# Patient Record
Sex: MALE | Race: WHITE | NOT HISPANIC OR LATINO | URBAN - METROPOLITAN AREA
[De-identification: names, ages, dates, MRNs, and addresses within clinical notes are randomized per-mention and may not be internally consistent; named-entity substitution may affect disease eponyms.]

---

## 2018-05-18 ENCOUNTER — EMERGENCY (EMERGENCY)
Facility: HOSPITAL | Age: 28
LOS: 1 days | Discharge: LEFT BEFORE DISCHARGE | End: 2018-05-18
Attending: EMERGENCY MEDICINE
Payer: MEDICAID

## 2018-05-18 VITALS
RESPIRATION RATE: 20 BRPM | OXYGEN SATURATION: 97 % | HEART RATE: 107 BPM | SYSTOLIC BLOOD PRESSURE: 143 MMHG | HEIGHT: 69 IN | TEMPERATURE: 99 F | DIASTOLIC BLOOD PRESSURE: 86 MMHG | WEIGHT: 169.98 LBS

## 2018-05-18 PROCEDURE — 93005 ELECTROCARDIOGRAM TRACING: CPT

## 2018-05-18 PROCEDURE — 99284 EMERGENCY DEPT VISIT MOD MDM: CPT

## 2018-05-18 PROCEDURE — 93010 ELECTROCARDIOGRAM REPORT: CPT

## 2018-05-18 PROCEDURE — 99285 EMERGENCY DEPT VISIT HI MDM: CPT

## 2018-05-18 NOTE — ED PROVIDER NOTE - PROGRESS NOTE DETAILS
Patient is refusing blood work and wants to leave. He will leave against medical. Nurse Carrillo discussed with the patient risks of dying without complete work up from further intoxication. Patient did not wait for me to explain the risks to him. He signed out against medical advice and verbalizes understanding that he may return to the ED at any time. He is neurologically intact and displays sound decision-making capability and is not clinically intoxicated at this time. His family is at the bedside and assumes responsibility for the patient at this time.

## 2018-05-18 NOTE — ED PROVIDER NOTE - MEDICAL DECISION MAKING DETAILS
Patient presents s/p overdose of heroin requiring narcan. Upon arrival, he is awake, alert and not clinically intoxicated. EKG unremarkable. He refuses blood work and "I just want to get out of here." Nursing discussed risks of leaving against medical advice, patient is of sound mind to refuse treatment and family is with patient who assumes responsibility.

## 2018-05-18 NOTE — ED PROVIDER NOTE - NS ED ROS FT
Const: Denies fever, chills  HEENT: Denies blurry vision, sore throat  Neck: Denies neck pain/stiffness  Resp: Denies coughing, SOB  Cardiovascular: Denies CP, palpitations, LE edema  GI: Denies nausea, vomiting, abdominal pain, diarrhea, constipation, blood in stool  : Denies urinary frequency/urgency/dysuria, hematuria  MSK: Denies back pain  Neuro: Denies HA, dizziness, numbness, weakness  Skin: Denies rashes.

## 2018-05-18 NOTE — ED PROVIDER NOTE - OBJECTIVE STATEMENT
28 y/o M pt with a hx of presents to the ED with c/o heroin OD today. Pt states he was with his girlfriend and friend at [facility] when ambulance came to bring him to ED. He was given Narcan by EMS. Pt injects heroin daily (1.5 bags). Pt also admits to smoking marijuana today. occasional EtOH use. Denies HA, CP, NVD. No further complaints at this time. 26 y/o M pt with a hx of presents to the ED with c/o heroin OD today. Pt states he was with his girlfriend and friend at [facility] when ambulance came to bring him to ED when he was found unresponsive. He cannot recall any further events, but was found in the 's seat of his friend's car. He does not drive and therefore believes that he was trying to lock his girlfriend and his friend out of the car to avoid going to the hospital. He was given Narcan by EMS and woke up. Pt injects heroin daily (1.5 bags today). Pt also admits to smoking marijuana today. He admits to occasional EtOH use, but denies any today. Denies HA,CP, N/V/D. No further complaints at this time.

## 2018-05-18 NOTE — ED PROVIDER NOTE - PHYSICAL EXAMINATION
Const: Awake, alert and oriented. In no acute distress. Well appearing.  HEENT: NC/AT. Moist mucous membranes.  Eyes: No scleral icterus. EOMI.  Neck:. Soft and supple. Full ROM without pain.  Cardiac: Regular rate and rhythm. +S1/S2. No murmurs. Peripheral pulses 2+ and symmetric. No LE edema.  Resp: Speaking in full sentences. No evidence of respiratory distress. No wheezes, rales or rhonchi.  Abd: Soft, non-tender, non-distended. Normal bowel sounds in all 4 quadrants. No guarding or rebound.  Back: Spine midline and non-tender. No CVAT.  Skin: No rashes, abrasions or lacerations.  Lymph: No cervical lymphadenopathy.

## 2018-05-18 NOTE — ED ADULT TRIAGE NOTE - CHIEF COMPLAINT QUOTE
Patient brought in by ambulance A/Ox3, was found on side of road unresponsive in his car-girlfriend called 911, recd 2mg Narcan.  Patient admits to using to much heroin today.

## 2018-05-18 NOTE — ED ADULT NURSE REASSESSMENT NOTE - NS ED NURSE REASSESS COMMENT FT1
pt standing up at bedside awake and alert states that he does not want to stay any longer. explain to him risk of leaving against medical advice. pt verbalized understanding refuses to stay family at bedside to take patient home. Dr Aaron georges.

## 2018-05-18 NOTE — ED PROVIDER NOTE - REFUSAL OF SERVICE, MDM
Pt understands and recalls risks of leaving against medical advice, including death from respiratory depression/arrest. Pt states that pt will see PMD. Pt advised to return to the ED if pt feels worse.

## 2020-06-02 NOTE — ED ADULT TRIAGE NOTE - CADM TRG TX PRIOR TO ARRIVAL
This is a recent snapshot of the patient's [facility] medical record.  For current drug dose and complete information and questions, call [phone] or In Basket pool, [facility] home infusion ([id number])  CSN Number:  [id number]      
medications/Narcan